# Patient Record
Sex: MALE | Race: BLACK OR AFRICAN AMERICAN | Employment: FULL TIME | URBAN - METROPOLITAN AREA
[De-identification: names, ages, dates, MRNs, and addresses within clinical notes are randomized per-mention and may not be internally consistent; named-entity substitution may affect disease eponyms.]

---

## 2017-04-10 ENCOUNTER — HOSPITAL ENCOUNTER (OUTPATIENT)
Age: 57
Setting detail: SPECIMEN
Discharge: HOME | End: 2017-04-10
Payer: COMMERCIAL

## 2017-04-10 LAB
ALBUMIN SERPL-MCNC: 3.6 G/DL (ref 3.5–5.2)
ALBUMIN/GLOBULIN RATIO: 0.9 (ref 1–2.5)
ALP BLD-CCNC: 51 U/L (ref 40–129)
ALT SERPL-CCNC: 16 U/L (ref 5–41)
ANION GAP SERPL CALCULATED.3IONS-SCNC: 15 MMOL/L (ref 9–17)
AST SERPL-CCNC: 24 U/L
BILIRUB SERPL-MCNC: 0.28 MG/DL (ref 0.3–1.2)
BILIRUBIN DIRECT: <0.08 MG/DL
BILIRUBIN, INDIRECT: ABNORMAL MG/DL (ref 0–1)
BUN BLDV-MCNC: 21 MG/DL (ref 6–20)
BUN/CREAT BLD: ABNORMAL (ref 9–20)
CALCIUM SERPL-MCNC: 9 MG/DL (ref 8.6–10.4)
CHLORIDE BLD-SCNC: 104 MMOL/L (ref 98–107)
CHOLESTEROL/HDL RATIO: 6.5
CHOLESTEROL: 279 MG/DL
CO2: 24 MMOL/L (ref 20–31)
CREAT SERPL-MCNC: 2.07 MG/DL (ref 0.7–1.2)
GFR AFRICAN AMERICAN: 40 ML/MIN
GFR NON-AFRICAN AMERICAN: 33 ML/MIN
GFR SERPL CREATININE-BSD FRML MDRD: ABNORMAL ML/MIN/{1.73_M2}
GFR SERPL CREATININE-BSD FRML MDRD: ABNORMAL ML/MIN/{1.73_M2}
GLOBULIN: ABNORMAL G/DL (ref 1.5–3.8)
GLUCOSE BLD-MCNC: 108 MG/DL (ref 70–99)
HCT VFR BLD CALC: 36.7 % (ref 41–53)
HDLC SERPL-MCNC: 43 MG/DL
HEMOGLOBIN: 11.9 G/DL (ref 13.5–17.5)
LDL CHOLESTEROL: 208 MG/DL (ref 0–130)
MCH RBC QN AUTO: 26.6 PG (ref 26–34)
MCHC RBC AUTO-ENTMCNC: 32.4 G/DL (ref 31–37)
MCV RBC AUTO: 81.9 FL (ref 80–100)
PDW BLD-RTO: 15.6 % (ref 12.5–15.4)
PLATELET # BLD: 209 K/UL (ref 140–450)
PMV BLD AUTO: 8.9 FL (ref 6–12)
POTASSIUM SERPL-SCNC: 4.9 MMOL/L (ref 3.7–5.3)
RBC # BLD: 4.48 M/UL (ref 4.5–5.9)
SODIUM BLD-SCNC: 143 MMOL/L (ref 135–144)
TOTAL PROTEIN: 7.4 G/DL (ref 6.4–8.3)
TRIGL SERPL-MCNC: 142 MG/DL
VLDLC SERPL CALC-MCNC: ABNORMAL MG/DL (ref 1–30)
WBC # BLD: 7.8 K/UL (ref 3.5–11)

## 2017-04-17 ENCOUNTER — HOSPITAL ENCOUNTER (OUTPATIENT)
Age: 57
Setting detail: SPECIMEN
Discharge: HOME | End: 2017-04-17
Payer: COMMERCIAL

## 2017-04-17 LAB
HAV IGM SER IA-ACNC: NONREACTIVE
HEPATITIS B CORE IGM ANTIBODY: NONREACTIVE
HEPATITIS B SURFACE ANTIGEN: NONREACTIVE
HEPATITIS C ANTIBODY: NONREACTIVE

## 2017-10-05 ENCOUNTER — HOSPITAL ENCOUNTER (OUTPATIENT)
Age: 57
Setting detail: SPECIMEN
Discharge: HOME | End: 2017-10-05
Payer: COMMERCIAL

## 2017-10-05 LAB
ALBUMIN SERPL-MCNC: 3.4 G/DL (ref 3.5–5.2)
ALBUMIN/GLOBULIN RATIO: 0.9 (ref 1–2.5)
ALP BLD-CCNC: 53 U/L (ref 40–129)
ALT SERPL-CCNC: 18 U/L (ref 5–41)
ANION GAP SERPL CALCULATED.3IONS-SCNC: 13 MMOL/L (ref 9–17)
AST SERPL-CCNC: 23 U/L
BILIRUB SERPL-MCNC: <0.1 MG/DL (ref 0.3–1.2)
BUN BLDV-MCNC: 25 MG/DL (ref 6–20)
BUN/CREAT BLD: ABNORMAL (ref 9–20)
CALCIUM SERPL-MCNC: 9 MG/DL (ref 8.6–10.4)
CHLORIDE BLD-SCNC: 106 MMOL/L (ref 98–107)
CO2: 24 MMOL/L (ref 20–31)
CREAT SERPL-MCNC: 1.96 MG/DL (ref 0.7–1.2)
FOLATE: 8.7 NG/ML
GFR AFRICAN AMERICAN: 43 ML/MIN
GFR NON-AFRICAN AMERICAN: 35 ML/MIN
GFR SERPL CREATININE-BSD FRML MDRD: ABNORMAL ML/MIN/{1.73_M2}
GFR SERPL CREATININE-BSD FRML MDRD: ABNORMAL ML/MIN/{1.73_M2}
GLUCOSE BLD-MCNC: 89 MG/DL (ref 70–99)
HCT VFR BLD CALC: 37.8 % (ref 41–53)
HEMOGLOBIN: 12 G/DL (ref 13.5–17.5)
MCH RBC QN AUTO: 26.3 PG (ref 26–34)
MCHC RBC AUTO-ENTMCNC: 31.7 G/DL (ref 31–37)
MCV RBC AUTO: 82.7 FL (ref 80–100)
PDW BLD-RTO: 16.3 % (ref 12.5–15.4)
PLATELET # BLD: 225 K/UL (ref 140–450)
PMV BLD AUTO: 8.9 FL (ref 6–12)
POTASSIUM SERPL-SCNC: 4.6 MMOL/L (ref 3.7–5.3)
RBC # BLD: 4.57 M/UL (ref 4.5–5.9)
SODIUM BLD-SCNC: 143 MMOL/L (ref 135–144)
TOTAL PROTEIN: 7.2 G/DL (ref 6.4–8.3)
VITAMIN B-12: 1394 PG/ML (ref 211–946)
VITAMIN D 25-HYDROXY: 17 NG/ML (ref 30–100)
WBC # BLD: 9.1 K/UL (ref 3.5–11)

## 2017-11-09 ENCOUNTER — HOSPITAL ENCOUNTER (OUTPATIENT)
Age: 57
Setting detail: SPECIMEN
Discharge: HOME | End: 2017-11-09
Payer: COMMERCIAL

## 2017-11-09 LAB
IRON SATURATION: 25 % (ref 20–55)
IRON: 64 UG/DL (ref 59–158)
TOTAL IRON BINDING CAPACITY: 259 UG/DL (ref 250–450)
UNSATURATED IRON BINDING CAPACITY: 195 UG/DL (ref 112–347)

## 2017-11-15 ENCOUNTER — HOSPITAL ENCOUNTER (OUTPATIENT)
Age: 57
Setting detail: SPECIMEN
Discharge: HOME | End: 2017-11-15
Payer: COMMERCIAL

## 2017-11-15 LAB
BUN BLDV-MCNC: 41 MG/DL (ref 6–20)
CREAT SERPL-MCNC: 2.04 MG/DL (ref 0.7–1.2)
DATE, STOOL #1: NORMAL
DATE, STOOL #2: NORMAL
DATE, STOOL #3: NORMAL
GFR AFRICAN AMERICAN: 41 ML/MIN
GFR NON-AFRICAN AMERICAN: 34 ML/MIN
GFR SERPL CREATININE-BSD FRML MDRD: ABNORMAL ML/MIN/{1.73_M2}
GFR SERPL CREATININE-BSD FRML MDRD: ABNORMAL ML/MIN/{1.73_M2}
HEMOCCULT SP1 STL QL: NEGATIVE
HEMOCCULT SP2 STL QL: NEGATIVE
HEMOCCULT SP3 STL QL: NEGATIVE
SEDIMENTATION RATE, ERYTHROCYTE: 25 MM (ref 0–10)
TIME, STOOL #1: NORMAL
TIME, STOOL #2: NORMAL
TIME, STOOL #3: NORMAL
URIC ACID: 8.8 MG/DL (ref 3.4–7)
WBC # BLD: 18.1 K/UL (ref 3.5–11.3)

## 2018-04-03 ENCOUNTER — HOSPITAL ENCOUNTER (OUTPATIENT)
Age: 58
Setting detail: SPECIMEN
Discharge: HOME | End: 2018-04-03
Payer: COMMERCIAL

## 2018-04-03 LAB
-: NORMAL
ABSOLUTE EOS #: 0.45 K/UL (ref 0–0.44)
ABSOLUTE IMMATURE GRANULOCYTE: <0.03 K/UL (ref 0–0.3)
ABSOLUTE LYMPH #: 2.42 K/UL (ref 1.1–3.7)
ABSOLUTE MONO #: 0.56 K/UL (ref 0.1–1.2)
AMORPHOUS: NORMAL
ANION GAP SERPL CALCULATED.3IONS-SCNC: 10 MMOL/L (ref 9–17)
BACTERIA: NORMAL
BASOPHILS # BLD: 0 % (ref 0–2)
BASOPHILS ABSOLUTE: 0.03 K/UL (ref 0–0.2)
BILIRUBIN URINE: NEGATIVE
BUN BLDV-MCNC: 21 MG/DL (ref 6–20)
BUN/CREAT BLD: ABNORMAL (ref 9–20)
CALCIUM SERPL-MCNC: 9.2 MG/DL (ref 8.6–10.4)
CASTS UA: NORMAL /LPF (ref 0–8)
CHLORIDE BLD-SCNC: 107 MMOL/L (ref 98–107)
CO2: 25 MMOL/L (ref 20–31)
COLOR: YELLOW
COMMENT UA: ABNORMAL
CREAT SERPL-MCNC: 1.94 MG/DL (ref 0.7–1.2)
CREAT SERPL-MCNC: 1.94 MG/DL (ref 0.7–1.2)
CREATININE CLEARANCE: 72.7 ML/MIN/BSA (ref 71–151)
CREATININE TIMED UR: ABNORMAL MG/ X H
CREATININE URINE: 113.8 MG/DL
CREATININE URINE: 114.2 MG/DL (ref 39–259)
CREATININE URINE: 129.2 MG/DL (ref 39–259)
CREATININE, 24H UR: 2779 MG/24 H (ref 1040–2350)
CRYSTALS, UA: NORMAL /HPF
DIFFERENTIAL TYPE: ABNORMAL
EOSINOPHILS RELATIVE PERCENT: 6 % (ref 1–4)
EPITHELIAL CELLS UA: NORMAL /HPF (ref 0–5)
GFR AFRICAN AMERICAN: 43 ML/MIN
GFR NON-AFRICAN AMERICAN: 36 ML/MIN
GFR SERPL CREATININE-BSD FRML MDRD: ABNORMAL ML/MIN/{1.73_M2}
GFR SERPL CREATININE-BSD FRML MDRD: ABNORMAL ML/MIN/{1.73_M2}
GLUCOSE BLD-MCNC: 113 MG/DL (ref 70–99)
GLUCOSE URINE: NEGATIVE
HCT VFR BLD CALC: 40.5 % (ref 40.7–50.3)
HEMOGLOBIN: 12.1 G/DL (ref 13–17)
HOURS COLLECTED: 24 H
HOURS COLLECTED: 24 H
IMMATURE GRANULOCYTES: 0 %
KETONES, URINE: NEGATIVE
LENGTH OF COLLECTION: 24 H
LEUKOCYTE ESTERASE, URINE: NEGATIVE
LYMPHOCYTES # BLD: 30 % (ref 24–43)
MCH RBC QN AUTO: 25.5 PG (ref 25.2–33.5)
MCHC RBC AUTO-ENTMCNC: 29.9 G/DL (ref 28.4–34.8)
MCV RBC AUTO: 85.4 FL (ref 82.6–102.9)
MICROALBUMIN/CREAT 24H UR: 1946 MG/L
MICROALBUMIN/CREAT UR-RTO: 1506 MCG/MG CREAT
MONOCYTES # BLD: 7 % (ref 3–12)
MUCUS: NORMAL
NITRITE, URINE: NEGATIVE
NRBC AUTOMATED: 0 PER 100 WBC
OTHER OBSERVATIONS UA: NORMAL
PATIENT HEIGHT: 178 CM
PATIENT WEIGHT: 123 KG
PDW BLD-RTO: 15 % (ref 11.8–14.4)
PH UA: 5.5 (ref 5–8)
PLATELET # BLD: 231 K/UL (ref 138–453)
PLATELET ESTIMATE: ABNORMAL
PMV BLD AUTO: 10.5 FL (ref 8.1–13.5)
POTASSIUM SERPL-SCNC: 5.2 MMOL/L (ref 3.7–5.3)
PROTEIN 24 HOUR URINE: 4615 MG/24 H
PROTEIN UA: ABNORMAL
PROTEIN,TOT TIMED UR: ABNORMAL MG/X H
RBC # BLD: 4.74 M/UL (ref 4.21–5.77)
RBC # BLD: ABNORMAL 10*6/UL
RBC UA: NORMAL /HPF (ref 0–4)
RENAL EPITHELIAL, UA: NORMAL /HPF
SEG NEUTROPHILS: 57 % (ref 36–65)
SEGMENTED NEUTROPHILS ABSOLUTE COUNT: 4.69 K/UL (ref 1.5–8.1)
SODIUM BLD-SCNC: 142 MMOL/L (ref 135–144)
SPECIFIC GRAVITY UA: 1.02 (ref 1–1.03)
TRICHOMONAS: NORMAL
TURBIDITY: CLEAR
URIC ACID: 9.3 MG/DL (ref 3.4–7)
URINE HGB: ABNORMAL
URINE TOTAL PROTEIN: 189 MG/DL
UROBILINOGEN, URINE: NORMAL
VITAMIN D 25-HYDROXY: 28.5 NG/ML (ref 30–100)
VOLUME: 2442 ML
WBC # BLD: 8.2 K/UL (ref 3.5–11.3)
WBC # BLD: ABNORMAL 10*3/UL
WBC UA: NORMAL /HPF (ref 0–5)
YEAST: NORMAL

## 2024-12-05 ENCOUNTER — PREP FOR PROCEDURE (OUTPATIENT)
Dept: UROLOGY | Age: 64
End: 2024-12-05

## 2024-12-05 ENCOUNTER — OFFICE VISIT (OUTPATIENT)
Dept: UROLOGY | Age: 64
End: 2024-12-05
Payer: MEDICARE

## 2024-12-05 ENCOUNTER — HOSPITAL ENCOUNTER (OUTPATIENT)
Age: 64
Setting detail: SPECIMEN
Discharge: HOME | End: 2024-12-05

## 2024-12-05 VITALS
SYSTOLIC BLOOD PRESSURE: 140 MMHG | HEART RATE: 59 BPM | WEIGHT: 307 LBS | BODY MASS INDEX: 43.43 KG/M2 | TEMPERATURE: 97.3 F | DIASTOLIC BLOOD PRESSURE: 80 MMHG

## 2024-12-05 DIAGNOSIS — C61 PROSTATE CANCER (HCC): Primary | ICD-10-CM

## 2024-12-05 DIAGNOSIS — N39.0 URINARY TRACT INFECTION WITHOUT HEMATURIA, SITE UNSPECIFIED: ICD-10-CM

## 2024-12-05 DIAGNOSIS — Z01.818 PRE-OP TESTING: Primary | ICD-10-CM

## 2024-12-05 DIAGNOSIS — C61 PROSTATE CANCER (HCC): ICD-10-CM

## 2024-12-05 PROCEDURE — G8419 CALC BMI OUT NRM PARAM NOF/U: HCPCS | Performed by: UROLOGY

## 2024-12-05 PROCEDURE — 1159F MED LIST DOCD IN RCRD: CPT | Performed by: UROLOGY

## 2024-12-05 PROCEDURE — G8484 FLU IMMUNIZE NO ADMIN: HCPCS | Performed by: UROLOGY

## 2024-12-05 PROCEDURE — 99205 OFFICE O/P NEW HI 60 MIN: CPT | Performed by: UROLOGY

## 2024-12-05 PROCEDURE — 1123F ACP DISCUSS/DSCN MKR DOCD: CPT | Performed by: UROLOGY

## 2024-12-05 PROCEDURE — G8427 DOCREV CUR MEDS BY ELIG CLIN: HCPCS | Performed by: UROLOGY

## 2024-12-05 PROCEDURE — 3017F COLORECTAL CA SCREEN DOC REV: CPT | Performed by: UROLOGY

## 2024-12-05 PROCEDURE — 1036F TOBACCO NON-USER: CPT | Performed by: UROLOGY

## 2024-12-05 RX ORDER — FUROSEMIDE 40 MG/1
TABLET ORAL
COMMUNITY
Start: 2024-10-09

## 2024-12-05 RX ORDER — ALLOPURINOL 100 MG/1
50 TABLET ORAL DAILY
COMMUNITY
Start: 2024-10-28

## 2024-12-05 RX ORDER — RIVAROXABAN 20 MG/1
TABLET, FILM COATED ORAL
COMMUNITY

## 2024-12-05 RX ORDER — CIPROFLOXACIN 500 MG/1
TABLET, FILM COATED ORAL
COMMUNITY
Start: 2024-11-07

## 2024-12-05 RX ORDER — COLCHICINE 0.6 MG/1
0.6 TABLET ORAL DAILY
COMMUNITY

## 2024-12-05 RX ORDER — METOPROLOL SUCCINATE 50 MG/1
TABLET, EXTENDED RELEASE ORAL
COMMUNITY
Start: 2024-10-06

## 2024-12-05 RX ORDER — ALBUTEROL SULFATE 90 UG/1
2 INHALANT RESPIRATORY (INHALATION) EVERY 6 HOURS PRN
COMMUNITY

## 2024-12-05 RX ORDER — ROSUVASTATIN CALCIUM 10 MG/1
10 TABLET, COATED ORAL DAILY
COMMUNITY

## 2024-12-05 RX ORDER — SODIUM BICARBONATE 650 MG/1
650 TABLET ORAL 4 TIMES DAILY
COMMUNITY

## 2024-12-05 RX ORDER — DAPAGLIFLOZIN 10 MG/1
10 TABLET, FILM COATED ORAL EVERY MORNING
COMMUNITY

## 2024-12-05 RX ORDER — CELECOXIB 100 MG/1
100 CAPSULE ORAL 2 TIMES DAILY
COMMUNITY

## 2024-12-05 RX ORDER — ERGOCALCIFEROL 1.25 MG/1
50000 CAPSULE, LIQUID FILLED ORAL WEEKLY
COMMUNITY

## 2024-12-05 ASSESSMENT — ENCOUNTER SYMPTOMS
CONSTIPATION: 0
SHORTNESS OF BREATH: 0
ABDOMINAL PAIN: 0
NAUSEA: 0
DIARRHEA: 0
CHOKING: 0
WHEEZING: 0
EYE PAIN: 0
VOMITING: 0
BACK PAIN: 0
COUGH: 0

## 2024-12-05 NOTE — PROGRESS NOTES
Review of Systems   Constitutional:  Negative for chills, fatigue and fever.   Eyes:  Negative for pain and visual disturbance.   Respiratory:  Negative for cough, choking, shortness of breath and wheezing.    Cardiovascular:  Negative for chest pain, palpitations and leg swelling.   Gastrointestinal:  Negative for abdominal pain, constipation, diarrhea, nausea and vomiting.   Genitourinary:  Negative for decreased urine volume, difficulty urinating, dysuria, flank pain, frequency, hematuria, scrotal swelling, testicular pain and urgency.   Musculoskeletal:  Negative for arthralgias, back pain, gait problem and joint swelling.   Skin:  Negative for rash and wound.   Neurological:  Negative for weakness.   Hematological:  Does not bruise/bleed easily.      Retention Suture Text: Retention sutures were placed to support the closure and prevent dehiscence.

## 2024-12-05 NOTE — PROGRESS NOTES
[facility] PHYSICIANS [facility], [facility] UROLOGY CENTER  [address]  [facility] [address]  Dept: [phone]    [facility] Urology Office Note - New Patient    Patient:  Chung Wade  YOB: 1960  Date: 12/5/2024    The patient is a 64 y.o. male who presentstoday for evaluation of the following problems:   Chief Complaint   Patient presents with    Prostate Cancer     Prostatectomy consult    referred by Vero Coy MD.    HPI  Here for prostate cancer recent diagnosis. Bx reviewed, no h/o belly surgery, is on anticoags for h/o dvt. [place] 6 one core    (Patient's old records have been requested, reviewed and summarized in today's note.)    Summary of old records: N/A    History: N/A    ProceduresToday: N/A    Urinalysis today:  No results found for this visit on 12/05/24.    AUA Symptom Score (12/5/2024):                               Last BUN andcreatinine:  Lab Results   Component Value Date    BUN 31 (H) 12/02/2024     Lab Results   Component Value Date    CREATININE 2.65 (H) 12/02/2024       Additional Lab/Culture results: none    Reviewed during this Office Visit: none  (results were independently reviewed byphysician and radiology report verified)    PAST MEDICAL, FAMILY AND SOCIAL HISTORY:  Past Medical History:   Diagnosis Date    Hypertension     Kidney trouble     Kidney produces to much protein     No past surgical history on file.  Family History   Problem Relation Age of Onset    Diabetes Father     Cancer Father     Heart Disease Father      Outpatient Medications Marked as Taking for the 12/5/24 encounter (Office Visit) with Estevan Piedra MD   Medication Sig Dispense Refill    allopurinol (ZYLOPRIM) 100 MG tablet Take 0.5 tablets by mouth daily      ciprofloxacin (CIPRO) 500 MG tablet       furosemide (LASIX) 40 MG tablet       metoprolol succinate (TOPROL XL) 50 MG extended release tablet       albuterol sulfate HFA (VENTOLIN HFA) 108

## 2024-12-06 LAB
MICROORGANISM SPEC CULT: NO GROWTH
SPECIMEN DESCRIPTION: NORMAL

## 2024-12-11 ENCOUNTER — TELEPHONE (OUTPATIENT)
Dept: UROLOGY | Age: 64
End: 2024-12-11

## 2024-12-16 ENCOUNTER — HOSPITAL ENCOUNTER (OUTPATIENT)
Dept: PREADMISSION TESTING | Age: 64
Discharge: HOME | End: 2024-12-20
Attending: UROLOGY | Admitting: UROLOGY
Payer: MEDICARE

## 2024-12-16 ENCOUNTER — OFFICE VISIT (OUTPATIENT)
Dept: UROLOGY | Age: 64
End: 2024-12-16
Payer: MEDICARE

## 2024-12-16 VITALS
TEMPERATURE: 97.8 F | SYSTOLIC BLOOD PRESSURE: 136 MMHG | WEIGHT: 300 LBS | DIASTOLIC BLOOD PRESSURE: 82 MMHG | OXYGEN SATURATION: 98 % | HEART RATE: 84 BPM | HEIGHT: 71 IN | BODY MASS INDEX: 42 KG/M2

## 2024-12-16 VITALS
RESPIRATION RATE: 18 BRPM | WEIGHT: 300 LBS | HEIGHT: 71 IN | TEMPERATURE: 97.5 F | OXYGEN SATURATION: 98 % | SYSTOLIC BLOOD PRESSURE: 132 MMHG | BODY MASS INDEX: 42 KG/M2 | DIASTOLIC BLOOD PRESSURE: 96 MMHG | HEART RATE: 62 BPM

## 2024-12-16 DIAGNOSIS — Z01.818 PRE-OP TESTING: ICD-10-CM

## 2024-12-16 DIAGNOSIS — C61 PROSTATE CANCER (HCC): Primary | ICD-10-CM

## 2024-12-16 DIAGNOSIS — N39.0 URINARY TRACT INFECTION WITHOUT HEMATURIA, SITE UNSPECIFIED: ICD-10-CM

## 2024-12-16 LAB
ABO + RH BLD: NORMAL
ANION GAP SERPL CALCULATED.3IONS-SCNC: 9 MMOL/L (ref 9–16)
ARM BAND NUMBER: NORMAL
BASOPHILS # BLD: 0 K/UL (ref 0–0.2)
BASOPHILS NFR BLD: 0 % (ref 0–2)
BLOOD BANK SAMPLE EXPIRATION: NORMAL
BLOOD GROUP ANTIBODIES SERPL: NEGATIVE
BUN SERPL-MCNC: 39 MG/DL (ref 8–23)
CALCIUM SERPL-MCNC: 9.4 MG/DL (ref 8.6–10.4)
CHLORIDE SERPL-SCNC: 106 MMOL/L (ref 98–107)
CO2 SERPL-SCNC: 26 MMOL/L (ref 20–31)
CREAT SERPL-MCNC: 2.7 MG/DL (ref 0.7–1.2)
EKG ATRIAL RATE: 57 BPM
EKG P AXIS: 40 DEGREES
EKG P-R INTERVAL: 220 MS
EKG Q-T INTERVAL: 406 MS
EKG QRS DURATION: 86 MS
EKG QTC CALCULATION (BAZETT): 395 MS
EKG R AXIS: 50 DEGREES
EKG T AXIS: 38 DEGREES
EKG VENTRICULAR RATE: 57 BPM
EOSINOPHIL # BLD: 0.3 K/UL (ref 0–0.4)
EOSINOPHILS RELATIVE PERCENT: 3 % (ref 0–4)
ERYTHROCYTE [DISTWIDTH] IN BLOOD BY AUTOMATED COUNT: 16.8 % (ref 11.5–14.9)
GFR, ESTIMATED: 25 ML/MIN/1.73M2
GLUCOSE SERPL-MCNC: 105 MG/DL (ref 74–99)
HCT VFR BLD AUTO: 38.9 % (ref 41–53)
HGB BLD-MCNC: 12.4 G/DL (ref 13.5–17.5)
LYMPHOCYTES NFR BLD: 2.3 K/UL (ref 1–4.8)
LYMPHOCYTES RELATIVE PERCENT: 21 % (ref 24–44)
MCH RBC QN AUTO: 26.4 PG (ref 26–34)
MCHC RBC AUTO-ENTMCNC: 31.8 G/DL (ref 31–37)
MCV RBC AUTO: 83.1 FL (ref 80–100)
MONOCYTES NFR BLD: 0.8 K/UL (ref 0.1–1.3)
MONOCYTES NFR BLD: 7 % (ref 1–7)
NEUTROPHILS NFR BLD: 69 % (ref 36–66)
NEUTS SEG NFR BLD: 7.1 K/UL (ref 1.3–9.1)
PLATELET # BLD AUTO: 192 K/UL (ref 150–450)
PMV BLD AUTO: 8 FL (ref 6–12)
POTASSIUM SERPL-SCNC: 5.5 MMOL/L (ref 3.7–5.3)
RBC # BLD AUTO: 4.68 M/UL (ref 4.5–5.9)
SODIUM SERPL-SCNC: 141 MMOL/L (ref 136–145)
WBC OTHER # BLD: 10.5 K/UL (ref 3.5–11)

## 2024-12-16 PROCEDURE — 99214 OFFICE O/P EST MOD 30 MIN: CPT | Performed by: NURSE PRACTITIONER

## 2024-12-16 PROCEDURE — 1159F MED LIST DOCD IN RCRD: CPT | Performed by: NURSE PRACTITIONER

## 2024-12-16 PROCEDURE — 87086 URINE CULTURE/COLONY COUNT: CPT

## 2024-12-16 PROCEDURE — G8427 DOCREV CUR MEDS BY ELIG CLIN: HCPCS | Performed by: NURSE PRACTITIONER

## 2024-12-16 PROCEDURE — 86850 RBC ANTIBODY SCREEN: CPT

## 2024-12-16 PROCEDURE — 85025 COMPLETE CBC W/AUTO DIFF WBC: CPT

## 2024-12-16 PROCEDURE — 3017F COLORECTAL CA SCREEN DOC REV: CPT | Performed by: NURSE PRACTITIONER

## 2024-12-16 PROCEDURE — 80048 BASIC METABOLIC PNL TOTAL CA: CPT

## 2024-12-16 PROCEDURE — G8419 CALC BMI OUT NRM PARAM NOF/U: HCPCS | Performed by: NURSE PRACTITIONER

## 2024-12-16 PROCEDURE — 86900 BLOOD TYPING SEROLOGIC ABO: CPT

## 2024-12-16 PROCEDURE — 93005 ELECTROCARDIOGRAM TRACING: CPT | Performed by: ANESTHESIOLOGY

## 2024-12-16 PROCEDURE — 1036F TOBACCO NON-USER: CPT | Performed by: NURSE PRACTITIONER

## 2024-12-16 PROCEDURE — 86901 BLOOD TYPING SEROLOGIC RH(D): CPT

## 2024-12-16 PROCEDURE — 1123F ACP DISCUSS/DSCN MKR DOCD: CPT | Performed by: NURSE PRACTITIONER

## 2024-12-16 PROCEDURE — G8484 FLU IMMUNIZE NO ADMIN: HCPCS | Performed by: NURSE PRACTITIONER

## 2024-12-16 PROCEDURE — 93010 ELECTROCARDIOGRAM REPORT: CPT | Performed by: INTERNAL MEDICINE

## 2024-12-16 PROCEDURE — 36415 COLL VENOUS BLD VENIPUNCTURE: CPT

## 2024-12-16 PROCEDURE — APPSS45 APP SPLIT SHARED TIME 31-45 MINUTES: Performed by: NURSE PRACTITIONER

## 2024-12-16 ASSESSMENT — ENCOUNTER SYMPTOMS
SHORTNESS OF BREATH: 0
NAUSEA: 0
COUGH: 0
CONSTIPATION: 0
SORE THROAT: 0
DIARRHEA: 0
ABDOMINAL PAIN: 0
EYE REDNESS: 0
ABDOMINAL PAIN: 0
BACK PAIN: 0
GASTROINTESTINAL NEGATIVE: 1
SHORTNESS OF BREATH: 0
RESPIRATORY NEGATIVE: 1
WHEEZING: 0
COUGH: 0
ALLERGIC/IMMUNOLOGIC NEGATIVE: 1
EYE PAIN: 0
BACK PAIN: 0
VOMITING: 0
NAUSEA: 0
COLOR CHANGE: 0
VOMITING: 0
EYES NEGATIVE: 1

## 2024-12-16 NOTE — DISCHARGE INSTRUCTIONS
wheelchair before arriving to the hospital.    The Day of Surgery:  Arrive at [facility] Surgery Entrance at the time directed by your surgeon and check in at the desk.     If you have a living will or healthcare power of , please bring a copy.    You will be taken to the pre-op holding area where you will be prepared for surgery.  A physical assessment will be performed by a nurse practitioner or house officer.  Your IV will be started and you will meet your anesthesiologist.    When you go to surgery, your family will be directed to the surgical waiting room, where the doctor should speak with them after your surgery.    After surgery, you will be taken to the recovery area.  When you are alert and stable, you will receive instructions and be prepared for discharge.     If you use a Bi-PAP or C-PAP machine, please bring it with you and leave it in the car in case it is needed in recovery room.

## 2024-12-16 NOTE — PROGRESS NOTES
[facility] PHYSICIANS [facility], [facility] UROLOGY CENTER  [address]  [facility] [address]  Dept: [phone]    [facility] Urology Office Note - Established    Patient:  Chung Wade  YOB: 1960  Date: 12/16/2024    The patient is a 64 y.o. male who presents todayfor evaluation of the following problems:   Chief Complaint   Patient presents with    Pre teaching       HPI  Patient is a 64-year-old male with prostate cancer presenting for preteaching.  He was dx with prostate ca by Dr. Ortiz @ [facility].  Pretreatment PSA 8.2.  He is scheduled for RALP with Dr. Piedra.  Voids well at baseline, does not take any meds for prostate or bladder.   Denies ED issues, able to achieve and maintain erections without difficulty.    Summary of old records: N/A    Additional History: N/A    Procedures Today: N/A    Teaching:     This patient presents today for education regarding his prostate cancer treatment, post-op care and rehabilitation for incontinence and penile health.      I met with Chung Wade for 30 minutes to discuss prostate cancer treatment including basic surgery information, pre-op instructions, and post-op care: catheter care and cleaning, application of thigh and overnight bag, Kegels exercise to begin now, to stop day of surgery and then to resume after catheter removal, constipation prevention, post op impotence and possible use of vacuum erection pump, activity restrictions and post op testing and appointments. Verbal, written and visual materials presented. Patient verbalized understanding of all materials presented, and handouts given for Kegels, constipation prevention and catheter care. All questions answered. Patient encouraged to call with any further questions or problems.    Urinalysis today:  No results found for this visit on 12/16/24.  Last several PSA's:  Lab Results   Component Value Date    PSA 8.227 (H) 10/14/2024    PSA 2.97

## 2024-12-16 NOTE — H&P
HISTORY and PHYSICAL  [facility]       NAME:  Chung Wade  MRN: [id number]   YOB: 1960   Date: 12/16/2024   Age: 64 y.o.  Gender: male       COMPLAINT AND PRESENT HISTORY:     Chung Wade is 64 y.o., male, undergoing preadmission testing related to prostate cancer with scheduled PROSTATECTOMY LAPAROSCOPIC ROBOTIC XI W/PELVIC LYMPH NODE DISSECTION per Dr. Piedra.     Diagnosed with prostate cancer in November, 2024.   Denies dysuria, hematuria, frequency, urgency, sensation of incomplete emptying.   Denies abdominal/pelvic pain.  Nocturia x 1 on average.   Denies fever or chills.  Denies Hx of MRSA infection.     PMHx includes:    Sees Dr. Coy for PCP with last visit in September, 2024. Pt has upcoming appt. Wednesday for medical clearance.     DM:  Associated medications include: farxiga  Per pt last Hgb A1C 6.2.     HTN, HLD:  Associated medications include: lasix, metoprolol, crestor, low dose aspirin  Denies recent or current chest pain/pressure, palpitations, SOB, headaches, dizziness, lower extremity edema.     BP Readings from Last 3 Encounters:   12/16/24 (!) 132/96   12/16/24 136/82   12/05/24 (!) 140/80     DVT:  Associated medications include: Xarelto    CKD, Glomerulonephritis:  Associated medications include: low dose aspirin  Sees Dr. Miranda for nephrology with last visit 12/05/24.    Sleep apnea:  Uses CPAP nightly.    Activity level:  Functional Capacity per patient:              1. Patient IS able to walk 2 city blocks on level ground without SOB.              2. Patient IS able to climb 2 flights of stairs without SOB.    Denies personal and family history of complications with anesthesia. Pt mother had confusion with anesthesia.         RECENT LABS, IMAGING AND TESTING     Narrative & Impression    Sinus bradycardia with 1st degree A-V block  Otherwise normal ECG  No previous ECGs available      Specimen Collected: 12/16/24 11:47 EST Last Resulted: 12/16/24

## 2024-12-17 ENCOUNTER — TELEPHONE (OUTPATIENT)
Dept: UROLOGY | Age: 64
End: 2024-12-17

## 2024-12-17 LAB
MICROORGANISM SPEC CULT: NO GROWTH
SERVICE CMNT-IMP: NORMAL
SPECIMEN DESCRIPTION: NORMAL

## 2024-12-17 NOTE — TELEPHONE ENCOUNTER
PROCEDURE MOVED TO [facility]- SAME DATE AND TIME, JUST A LOCATION CHANGE.   Robotic prostatectomy w/ plnd @ [facility] 12/26/24 8:00 **STOP BLOOD THINNERS 12/19/24**   PAT done at [facility] 12/16/24        Left voicemail for patient, patient to call back and confirm location change

## 2024-12-18 NOTE — TELEPHONE ENCOUNTER
Robotic Prostatectomy with PLND @ [facility] 24 8:00am   **STOP BLOOD THINNERS on 24**    PAT: 24 @ 11:30am   Pre teachin24 @ 10:20am   Cystogram: 25 @ 10:30am   Post Op: 24 @ 11:40am     Spoke with patient in office, procedure information given to patient.

## 2024-12-23 DIAGNOSIS — Z01.818 PRE-OP TESTING: Primary | ICD-10-CM

## 2024-12-26 ENCOUNTER — TELEPHONE (OUTPATIENT)
Dept: UROLOGY | Age: 64
End: 2024-12-26

## 2025-01-13 ENCOUNTER — TELEPHONE (OUTPATIENT)
Dept: UROLOGY | Age: 65
End: 2025-01-13

## 2025-01-15 NOTE — TELEPHONE ENCOUNTER
January 15, 2025       Chung Wade YOB: 1960   [address]  [facility] [address] Date of Visit:  1/15/2025       Dear Chung Wade ,     This letter is to inform you that our office has made several attempts to get you scheduled for your procedure. A failure to schedule could results in serious harm to your condition. If you have sought alternative care or wish to schedule an appointment to further discuss your options with your physician please call our office at your earliest convenience at [phone] Option 2.      Sincerely,        Estevan Piedra MD        -letter sent  Recall placed

## 2025-02-10 ENCOUNTER — TELEPHONE (OUTPATIENT)
Dept: UROLOGY | Age: 65
End: 2025-02-10

## 2025-02-10 NOTE — TELEPHONE ENCOUNTER
Returned call to patient from  that was left on surgery line.     Patient states that he has decided on alternative decisions and will be going through [facility]. Patient informed note will be put into chart and he is to contact office if anything is needed. Patient verbalizes understanding and call was ended.